# Patient Record
Sex: FEMALE | Race: WHITE | URBAN - NONMETROPOLITAN AREA
[De-identification: names, ages, dates, MRNs, and addresses within clinical notes are randomized per-mention and may not be internally consistent; named-entity substitution may affect disease eponyms.]

---

## 2020-12-28 ENCOUNTER — TELEPHONE (OUTPATIENT)
Dept: FAMILY MEDICINE CLINIC | Age: 35
End: 2020-12-28

## 2021-01-07 ENCOUNTER — VIRTUAL VISIT (OUTPATIENT)
Dept: FAMILY MEDICINE CLINIC | Age: 36
End: 2021-01-07
Payer: COMMERCIAL

## 2021-01-07 DIAGNOSIS — M25.50 ARTHRALGIA, UNSPECIFIED JOINT: ICD-10-CM

## 2021-01-07 DIAGNOSIS — R63.5 WEIGHT GAIN: ICD-10-CM

## 2021-01-07 DIAGNOSIS — M79.671 CHRONIC FOOT PAIN, RIGHT: ICD-10-CM

## 2021-01-07 DIAGNOSIS — M79.672 PAIN OF BOTH HEELS: ICD-10-CM

## 2021-01-07 DIAGNOSIS — G89.29 CHRONIC FOOT PAIN, RIGHT: ICD-10-CM

## 2021-01-07 DIAGNOSIS — Z00.00 ENCOUNTER FOR GENERAL HEALTH EXAMINATION: Primary | ICD-10-CM

## 2021-01-07 DIAGNOSIS — E78.5 ELEVATED LIPIDS: ICD-10-CM

## 2021-01-07 DIAGNOSIS — M79.671 PAIN OF BOTH HEELS: ICD-10-CM

## 2021-01-07 DIAGNOSIS — K59.01 SLOW TRANSIT CONSTIPATION: ICD-10-CM

## 2021-01-07 DIAGNOSIS — K90.89 OTHER INTESTINAL MALABSORPTION: ICD-10-CM

## 2021-01-07 PROCEDURE — 99385 PREV VISIT NEW AGE 18-39: CPT | Performed by: FAMILY MEDICINE

## 2021-01-07 NOTE — LETTER
[address] [place] [address],[address] [facility] [address]  Phone: [phone]  Fax: [phone]    Dakota Martin MD        January 7, 2021    [address] [place] [address]      Dear Eric Batista:    Here are the lab orders and video notes we discussed    If you have any questions or concerns, please don't hesitate to call.     Sincerely,        Dakota Martin MD

## 2021-01-07 NOTE — PROGRESS NOTES
[address] [facility] [address] [address]  Dept: [phone]  Dept Fax: [phone]  Loc: Mayo Sood is a 30 y.o. White female. Stephanie Mccormack  presents to the [facility] Medicine-Residency clinic today by doxy,me video visit which was performed due to the Covid-19 pandemic via a 'synchronous telecommunication system and the Location of the Patient was in their Home, while the Location of the provider was in the provider's home for   Chief Complaint   Patient presents with   [address] Weight Loss     down to 142 when had son, worked with a nutritionist, gained 30#    Constipation    Back Pain     rad tech    Joint Pain     right knee from squats   ,  and;   1. Encounter for general health examination    2. Elevated lipids    3. Other intestinal malabsorption    4. Slow transit constipation    5. Chronic foot pain, right    6. Arthralgia, unspecified joint    7. Pain of both heels    8. Weight gain      I have reviewed [place] medical, surgical and other pertinent history in detail, and have updated medication and allergy information in the computerized patientrecord. Clinical Care Team:     -Referring Provider for today's consult: Self Referred  -Primary Care Provider: Rodney Gibbs    Medical/Surgical History:   She  has no past medical history on file. Her  has no past surgical history on file. Family/Social History:     Her family history is not on file. She      Medications/Allergies/Immunizations:     Her current medication(s) include No current outpatient medications on file. Allergies: Patient has no known allergies. ,  Immunizations: There is no immunization history on file for this patient.      History of PresentIllness: Trini's had concerns including Establish Care, Weight Loss (down to 142 when had son, worked with a nutritionist, gained 30#), Constipation, Back Pain (rad tech), and Joint Pain (right knee from squats). Nabila Dominguez  presents to the [address] [place] [address] today for;   Chief Complaint   Patient presents with   [address] Weight Loss     down to 142 when had son, worked with a nutritionist, gained 30#    Constipation    Back Pain     rad tech    Joint Pain     right knee from squats   , ,  abnormal labs follow up and these conditions as she  Is looking today for:     1. Encounter for general health examination    2. Elevated lipids    3. Other intestinal malabsorption    4. Slow transit constipation    5. Chronic foot pain, right    6. Arthralgia, unspecified joint    7. Pain of both heels    8. Weight gain      HPI    Subjective:     Review of Systems   All other systems reviewed and are negative. Objective: There were no vitals taken for this visit. Physical Exam  Constitutional:       Appearance: Normal appearance. HENT:      Head: Normocephalic. Pulmonary:      Effort: Pulmonary effort is normal.   Neurological:      Mental Status: She is alert. Psychiatric:         Mood and Affect: Mood normal.         Thought Content: Thought content normal.            Laboratory Data:   Lab results were searched in Care Everywhere and/or those brought by the pateint were reviewed today with Ruchi Tierney and she has a copy of their most recent labs to take home with them as notedbelow;       Imaging Data:   Imaging Data:       Assessment & Plan:       Impression:  1. Encounter for general health examination    2. Elevated lipids    3. Other intestinal malabsorption    4. Slow transit constipation    5. Chronic foot pain, right    6. Arthralgia, unspecified joint    7. Pain of both heels    8.  Weight gain      Assessment and Plan: Tesha Goodpasture will bring food+drink symptom log to next visit for inclusion in their record      - 75 better food list reviewed & given topatient with the omega 6 food list to avoid         - Gluten in corn and oats abstracts sheet reviewed and given to the patient today   3. Greater than 45 GT minutes were spent face to face on this visit of which >50% was for counseling and coordination of care; by doxy,me video visit which was performed due to the Covid-19 pandemic via a 'synchronous telecommunication system and the Location of the Patient was in their Home, while the Location of the provider was in the provider's home. Patients food and drinks were reviewed with thepatient,   - they will bring a food drink symptom log to future visits for inclusion in their record    - 75 better food list reviewed & given to patient along with the omega 6 food list to avoid      - Glutenin corn and oats abstracts sheet reviewed and given to the patient today    - 23 Foods containing Latex-like proteins was reviewed and copy to be taken if desired     - Nutrient Supplements list provided and copyto be taken if desired    - [employer]. com web site offered to patient to review at their convenience by staff with login information    Note:  I have discussed with the patient that with all nutraceuticals, there is often mixed data and emerging research which needs to be monitored; as well as an array of NIHfact sheets on nutrients and supplements. If I have recommended cinnamon at the request of this patient to assist them in control of their blood sugar, triglyceride and or weight issues. I discussed that thepatient's clinical use of cinnamon bark, calcium, magnesium, Vitamin D and pharmaceutical grade CVS #117663 fish oil or triple-strength fish oil, and B-75 two phase time-released B complex by Verda Brunner will be for atime-limited trial to determine their individual effectiveness and safety in this patient. I also referred the patient to the NMCD: Nutrition, Metabolism, and Cardiovascular Diseases (journal) and concerns about long-termuse and hepatotoxicity of cinnamon and other nutrients and suggest they frequently search nih.gov for the latest non-proprietary information on nutriceuticals as well as consider a subscription to [employer] fordetails on reviewed supplements, or at the least review the nutrient files at [address] at [facility], [employer], an insulin mimetic, reduces some High Carbohydrate Dietary Impacts. Methylhydroxychalcone polymers insulin-enhancing properties in fat cells are responsible for enhanced glucose uptake, inhibiting hepatic HMG-CoA reductase and lowers lipids. www.jacn. org/content/20/4/327.full     But cinnamon with additivessuch as Cinnamon Extract are not effective as insulin mimetics.  :eStoreDirectory.at     Nutrients for Start up from [employer] or [employer] for ease to get started now ;  Rolf Stewart has some useable products;  - Triple Strength Fish Oil, enteric coated  - Vit D 3 5000 IU gel caps  - Iron ferrous sulfat 325 mg tabs  - Centrum Silver look-a-like for most patients, or  - Centrum plain look-a-like if need iron    Localpharmacies or chains such as [employer], [employer], Wal-mart, have;  - Triple Strength Fish Oil (enteric coated ifavailable) or    If not enteric coated, can take from freezer for less burps - B-50 or B-100 released balanced B complex tabs  - Cinnamon bark 500 mg (without Chromium or extracts)   some brands list 1000 mg / serving of 2 capsules,    some brands have 1000 mg caps with the undesireable chromium / extract  - Calcium carbonate/citrate, magnesium oxide/citrate, Vit D 3  as 3-4 tabs/caps/serving     Some Local Brands may contain Zincwhich is acceptable for the first bottle or two  - Magnesium oxide 250 mg tabs for those having < 2 bowel movements daily  - Magnesium citrate 200 mg if having > 2bowel movement/day  - Centrum Silver or look-a-like for most patients, Centrum plain or look-a-like with iron  - Vitamin D-3 comes as 1,000 IU or 2,000 IU or 5,000 IU gel caps or Liquid drops      Some brands containing or derived from soy oil or corn oil are OK if not allergic to soy  - Elemental Iron 65 mg tabsat bedtime is available over the counter if need more iron     Usually turns bowel movements grey, green or black but not a concern  - Apricot Kernel Oil (by Now) for dry skin sensitive perineal or perianal area skin    Nutrients for ongoing use by Mail order for less expense from [employer] ;  - Strength Fish Oil , 240 Softgels Item #168734  -B-100 time released balanced B complex Item #682517  - Cinnamon bark 500 mg without Chromium or extract Item #911860  - Calcium carbonate 1000 mg, Magnesium oxide 500 mg, Vit D 3  400 IU Item #018368  - Magnesium oxide 500 mg tabs Item #137710 if less than 2 bowel movements daily  - [employer] Item #091990 for mostpatients, One Daily Item #849226 with iron  - Vit D 3  1,000 Item #247154      2,000 IU Item #826224  ,000 IU Item #495685     Some brands containing orderived from soy oil or corn oil are OK if not allergic to soy    Nutrients for Special Needs by Joe Mckenzie for less expense from www. puritan.com ;  -Elemental Iron 65 mg tabs Item #647656 if need more iron for low iron on labs Usually turns bowel movements grey, green or black but not a concern  - Time released Niacin 250 mg Item #266445 for cold intolerance, low libido or impotence  - DHEA 50 mg Item #200307 for improving DHEA levels on labs if having Fatigue    If stools too loose substitute for your Magnesium oxide using;   Magnesium citrate 200 mg tabs(NOT liquid) at [employer]   Magnesium gluconate 550 mgby Varun at [employer] com or amazon. com  Magnesium chloride foot soaks or body sprays  www.[employer]   Magnesium chloride flakes 14.99 Item #: [id number] if Backordered get spray    Food Drink Symptom Log;  I asked this patient to track these items and any other symptoms on their list on a weekly basis to documenttheir progress or lack of same. This can be done on the symptom tracking sheet I gave them at today's visit but looks like this:                                                      Rate on scale of 0-10 with zero = notnoticeable  Symptom:                            Week 1               2                 3                 4               Etc            Hair loss    Foot cramps    Paresthesia    Aches    IBS (irritable bowel)    Constipation    Diarrhea  Nocturia    (up to bathroom at night)    Fatigue/Energy level  Stress      On the other side of the sheet they can track their food, drink, environment, activity, symptoms etc      Avoiding Latex-like proteins inmy foods; Avocados, Bananas, Celery, Figs & Kiwi proteins have latex-like proteins to inflame our immunesystems  How Can I Have A Latex Allergy? Eating foods with latex-like protein exposes us to latex allergies. August; apples, beans, beets, blueberries, cabbage, cantaloupe, carrots,cauliflower, celery, cucumbers, eggplant, grapes, green onions, greens, lettuce, onions, parsley, peas, peaches, pears, bell peppers, potatoes, radishes, squash, sweet corn, tomatoes, turnips, watermelons  September; apples, beans, beets, blueberries, cabbage, cantaloupe, carrots, cauliflower, celery, cucumbers, eggplant, grapes,green onions, greens, lettuce, onions, parsley, peas, peaches, pears, bell peppers, plums, potatoes, pumpkins, radishes, fall red raspberries, squash, sweet corn, tomatoes, turnips, watermelons  October; apples, beets, broccoli, cabbage, carrots, cauliflower, celery, green onions, greens, lettuce, parsley, peas, pears, potatoes,pumpkins, radishes, fall red raspberries, squash, turnips  November; broccoli, cabbage, carrots, parsley,pears, peas  December: use canned, frozen ordried fruits since lower in latex    Upto half of latex-sensitive patients show allergic reactions to fruits (avocados, bananas, kiwifruits, papayas, peaches),   Annals of Allergy, 1994. These plants contain the same proteins that are allergens in latex. People with fruit allergies should warn physicians beforeundergoing procedures which may cause anaphylactic reaction if in contact with latex gloves. Some of the common foods with defined cross-reactivity to latexare avocado, banana, kiwi, chestnut, raw potato, tomato,stone fruits (e.g., peach, cherry), hazelnut, melons, celery, carrot, apple, pear, papaya, and almond.   Foods with less well-defined cross-reactivity to latex are peanuts, peppers, citrus fruits, coconut, pineapple, pete,fig, passion fruit, Ugli fruit, and grape This fruit/latex cross-reactivity is worsened by ethylene, a gas used to hasten commercial ripening. In nature, plants produce low levels of the hormone ethylene, which regulates germination, flowering, and ripening. Forced ripening by high ethyleneconcentrations, plants produce allergenic wound-repair proteins, which are similar to wound-repair proteins made during the tapping of rubber trees. Sensitive individualswho ingest the fruit get a higher dose and worse reaction. Some people may even first become sensitized to latex through fruit. Can food processing increase theconcentrations of allergenic proteins? Latex-sensitized children (and adults) in [place] often experience allergic reactions after eating bananas ripenedartificially with ethylene. In the [facility], food distribution centers treat unripe bananas and other produce with ethylene to ripen; not commonly done in [facility] since fruit is tree-ripened there. Does treatmentof food with ethylene induce banana proteins that cross-react with latex? (Evelia et al.    References:   Latex in Foods Allergy, http://ehp.niehs.nih.gov/members/2003/5811/5811.html    Search web for \" Whats in Season \" for whereyou live or are at the time you food shop  www.nutritioncouncil.org/pdf/healthy/SeasonalProduce. pdf ,   Management of Latex, ://medicalcenter. o[facility].edu/  search for latex

## 2021-01-12 ENCOUNTER — PATIENT MESSAGE (OUTPATIENT)
Dept: FAMILY MEDICINE CLINIC | Age: 36
End: 2021-01-12

## 2021-01-25 ENCOUNTER — PATIENT MESSAGE (OUTPATIENT)
Dept: FAMILY MEDICINE CLINIC | Age: 36
End: 2021-01-25

## 2021-01-25 NOTE — TELEPHONE ENCOUNTER
From: Cirilo Harvey  To: Kalin Jade MD  Sent: 1/12/2021 12:16 PM EST  Subject: Test Results Question    Hello,   Do I need to be fasting for my bloodwork?   Thank you,    Claudeen Igo  (I have several other questions, that I'll send separately.)

## 2021-01-27 ENCOUNTER — PATIENT MESSAGE (OUTPATIENT)
Dept: FAMILY MEDICINE CLINIC | Age: 36
End: 2021-01-27

## 2021-01-27 NOTE — TELEPHONE ENCOUNTER
From: Isabel Barrera  To: An Fletcher MD  Sent: 1/27/2021 3:10 PM EST  Subject: Test Results Question    Okay, thank you! What is the website? [facility]       ----- Message -----   From:MA Juan Jose Art   Sent:1/27/2021 3:04 PM EST   To:Trini Vizcaino   Subject:RE: Test Results Question    [email or web address]    Log in: [facility] word: [id number] I warn you that the recipes section doesn't contain anything. You are now a member of the web site. Hope you find the information informative! Rustam Gibson      ----- Message -----   From:Trini Cross   Sent:1/27/2021 12:58 PM EST   To:Elan Madrid MD   Subject:Test Results Question    OK that would be great! Yes my email is [email or web address] you can use [email or web address] as my password. Thank you,  [facility]      ----- Message -----   From:JERRY Blakeor Art   Sent:1/27/2021 12:47 PM EST   To:Trini Vizcaino   Subject:RE: Test Results Question    I can make you a member of his classes on a web site. I need a user name, and a password that is 8 characters. To confirm I have your email as [email or web address]    Is this correct? The web site doesn't contain any patient information, so just type it the same way you would like it entered into our system. Rustam Gibson      ----- Message -----   From:Trini Cross   Sent:1/27/2021 10:33 AM EST   To:Elan Madrid MD   Subject:Test Results Question    Antonetta Collet, that makes sense. Is licensure in [place] something he'd consider at some point? Not sure how complex that process is. We're just getting started with him and are very happy with him! He's very thorough, knowledgeable and patient. Also, do you have any podcasts that cover his wee protocol more in depth? I've just read his book, which was helpful; but I feel like I've just scratched the surface.      Thank you,  Mel Litten      ----- Message -----   From:JERRY Cordova   Sent:1/26/2021 9:23 AM EST   To:Trini Vizcaino   Subject:RE: Test Results Question    I am sorry, but a telephone visit will not work either. The issue is that he is not licensed in your state. He needs to have a license in the state to practice. This is a state and federal ruling, not ours. ----- Message -----   From:Trini Aguilera   Sent:1/25/2021 5:03 PM EST   To:Elan Rogers MD   Subject:RE: Test Results Question    P.S. This is one of the gf bread options I was looking at, but wasn't sure if sourghum flour and agave syrup were ok? Thoughts? Thanks,  Sarah Agee       ----- Message -----   From:Elan Rogers MD   Sent:1/20/2021 7:16 AM EST   To:Trini Aguilera   Subject:RE: Test Results Question    Thanks for the update,   I reviewed those done and sent you a Qraniot message with ? Where not done  Ask the lab to fax the final labs to [phone]      ----- Message -----   From:Trini Vizcaino   Sent:1/19/2021 10:22 PM EST   To:Elan Rogers MD   Subject:RE: Test Results Question    Thank you for answering my questions. I wanted to let you know that I went to have my bloodwork done last week & most of my results are back. Still looks like there's a few more to come back. I wanted to make sure you were aware and were able to access them, as the hospital I went to had to put my PCP as the ordering physician [Dr. Ankur Harrison.   Thanks,  Sarah Agee      ----- Message -----   From:Elan Rogers MD   Sent:1/14/2021 10:00 AM EST   To:Trini Vizcaino   Subject:RE: Test Results Question    I also wanted to see what you're thoughts on dairy are?   = good if not allergic    And thoughts are pasture raised/free range chicken?   = good but still can not process the corn its eaten so stores that since being hatched which is still toxic to you    By eating significantly lower calories, do you think that could cause my hormones to become unbalanced?   = should make the hormones go further so hormones should be better balanced    Obviously there isn't a numerical answer for this without bloodwork before and after for comparison, but in your experience what are your thoughts on this?   =that is my observations  Goran Welch      ----- Message -----   From:Trini Vizcaino   Sent:1/14/2021 9:07 AM EST   To:Elan Turcios MD   Subject:RE: Test Results Question    Okay, thank you for answering all  Of my questions. I just had my bloodwork done this morning! I also wanted to see what you're thoughts on dairy are? And thoughts are pasture raised/free range chicken? By eating significantly lower calories, do you think that could cause my hormones to become unbalanced? Obviously there isn't a numerical answer for this without bloodwork before and after for comparison, but in your experience what are your thoughts on this? Thank you,  Nancy Kuhn      ----- Message -----   Velma Grady MD   Sent:1/13/2021 11:17 AM EST   To:Trini Vizcaino   Subject:RE: Test Results Question    1,000 calories/day?   = if you want to lose weight it takes 4000 calories lost or burnt to lose 1# of weigh    Do you think that the number of calories you eat effects your metabolism?  = number of calories require higher metabolism not vice versa     And hormones?   = DHEA level, testosterone level and T3, T4 levels do     Specifically since I'm very active with my job   = activity counts if continuous movement for 30 min at a time but stops when you body stops moving    and with my workouts?   = metabolic increase acts for 48 hours    The nutrition  I was working with had my increase from 7921-8879; but too quickly. ..,hence my 25lb weight gain.    = 25,4000 Feliz =100,000 calories to burn or store    How many grams of protein/fat?   = depends on weight and body type      Are steel cut oats safe?   = all oats on the planet = 19 ppm gluten, including those labeled gluten free since they are >5 ppm          ----- Message -----   From:Trini Vizcaino   Sent:1/13/2021 10:51 AM EST   To:Elan Turcios MD Subject:RE: Test Results Question    1,000 calories/day? Do you think that the number of calories you eat effects your metabolism? And hormones? Specifically since I'm very active with my job and with my workouts? The nutrition  I was working with had my increase from 6053-3885; but too quickly. ..,hence my 25lb weight gain. How many grams of protein/fat? Are steel cut oats safe? Thank you,  Royal Espinal       ----- Message -----   Jesu Riggs MD   Sent:1/13/2021 6:51 AM EST   To:Trini Vizcaino   Subject:RE: Test Results Question    -Do you have a recommendation for how many calories, grams of fat/protein I should be eating daily for weight loss? = 1000 per day if want to lose weight plus work up to 30 min of safe exercise every other day    -How many milligrams of magnesium do you recommend daily? (Do you recommend a certain brand-free of additives?)   =see your lab letter which addresses both      -What are your thoughts on raw honey, pure maple syrup, cane sugar, coconut sugar, raw sugar?   = all good except Stevia, monk fruit,     -Are there any nuts that you feel are safe?   = macadamia    -How about nut butters?   = none    -are beans, besides chickpeas OK?   = yes see your handout    -Avocado oil?   = no  If not, which oils are healthy and safe for cooking with?   = butter, coconut oil, olive oil, lard are the only safe ones    Thoughts on gluten-free oats? Or do they still contain the gluten like component? steel cut oats? =not gluten free since have 19 ppm of gluten, to be gluten free medically need to be < 5 ppm    -aerobic exercise for < 30min every other day? And is strength training ok in between those days?   = yes if reasonable    -organic chicken?  Or is that still corn fed?   = yes still not good since stored all the corn oil since hatched    Do you have any podcasts/ additional nutrition information available in addition to your book?   = yes Leonid Gallardo can give you a password for the 6 free online lectures mentioned in your lab letter      ----- Message -----   From:Tirni Vizcaino   Sent:1/12/2021 4:45 PM EST   To:Elan Briseno MD   Subject:RE: Test Results Question    OK, thank you! I have a few questions for you after our last conversation and as Im reading your book:    -Do you have a recommendation for how many calories, grams of fat/protein I should be eating daily for weight loss?    -How many milligrams of magnesium do you recommend daily? (Do you recommend a certain brand-free of additives?)      -What are your thoughts on Stevia, monk fruit, raw honey, pure maple syrup, cane sugar, coconut sugar, raw sugar? -Are there any nuts that you feel are safe? -How about nut butters?    -are beans, besides chickpeas OK?    -Avocado oil? If not, which oils are healthy and safe for cooking with? Thoughts on gluten-free oats? Or do they still contain the gluten like component? steel cut oats?    -aerobic exercise for < 30min every other day? And is strength training ok in between those days?    -organic chicken? Or is that still corn fed? Do you have any podcasts/ additional nutrition   information available in addition to your book? Thank you,  Varun Ho       ----- Message -----   From:Elan Briseno MD   Sent:1/12/2021 2:32 PM EST   To:Trini Vizcaino   Subject:RE: Test Results Question    Can be but do not need to be for us      ----- Message -----   From:Trini Vizcaino   Sent:1/12/2021 12:16 PM EST   To:Elan Briseno MD   Subject:Test Results Question    Hello,   Do I need to be fasting for my bloodwork?   Thank you,    Varun Ho  (I have several other questions, that I'll send separately.)

## 2021-01-27 NOTE — TELEPHONE ENCOUNTER
From: Jonatan Escudero  To: Mally Martinez MD  Sent: 1/27/2021 12:58 PM EST  Subject: Test Results Question    OK that would be great! Yes my email is [email or web address] you can use [email or web address] as my password. Thank you,  Gergennita Leslie      ----- Message -----   From:JERRY Laws   Sent:1/27/2021 12:47 PM EST   To:Trinijuanjose Vizcaino   Subject:RE: Test Results Question    I can make you a member of his classes on a web site. I need a user name, and a password that is 8 characters. To confirm I have your email as [email or web address]    Is this correct? The web site doesn't contain any patient information, so just type it the same way you would like it entered into our system. Alison Demarcus      ----- Message -----   From:Trini Cotto   Sent:1/27/2021 10:33 AM EST   To:Elan Sheldon MD   Subject:Test Results Question    Avelina Lorenz, that makes sense. Is licensure in [place] something he'd consider at some point? Not sure how complex that process is. We're just getting started with him and are very happy with him! He's very thorough, knowledgeable and patient. Also, do you have any podcasts that cover his wee protocol more in depth? I've just read his book, which was helpful; but I feel like I've just scratched the surface. Thank you,  Ricky Banks      ----- Message -----   From:JERRY Laws   Sent:1/26/2021 9:23 AM EST   To:Trini Vizcaino   Subject:RE: Test Results Question    I am sorry, but a telephone visit will not work either. The issue is that he is not licensed in your state. He needs to have a license in the state to practice. This is a state and federal ruling, not ours. ----- Message -----   From:Trini Cotto   Sent:1/25/2021 5:03 PM EST   To:Elan Sheldon MD   Subject:RE: Test Results Question    P.S. This is one of the gf bread options I was looking at, but wasn't sure if sourghum flour and agave syrup were ok? Thoughts?   Thanks,  Greg Leslie       ----- Message -----   From:Elan BARNES Roxane Phillips MD   Sent:1/20/2021 7:16 AM EST   To:Trini Vizcaino   Subject:RE: Test Results Question    Thanks for the update,   I reviewed those done and sent you a [employer] message with ? Where not done  Ask the lab to fax the final labs to [phone]      ----- Message -----   From:Trini Vizcaino   Sent:1/19/2021 10:22 PM EST   To:Elan Saenz MD   Subject:RE: Test Results Question    Thank you for answering my questions. I wanted to let you know that I went to have my bloodwork done last week & most of my results are back. Still looks like there's a few more to come back. I wanted to make sure you were aware and were able to access them, as the hospital I went to had to put my PCP as the ordering physician [Dr. Tim Dorsey. Thanks,  Katelyn Rodriguez      ----- Message -----   From:Elan Saenz MD   Sent:1/14/2021 10:00 AM EST   To:Trini Vizcaino   Subject:RE: Test Results Question    I also wanted to see what you're thoughts on dairy are?   = good if not allergic    And thoughts are pasture raised/free range chicken?   = good but still can not process the corn its eaten so stores that since being hatched which is still toxic to you    By eating significantly lower calories, do you think that could cause my hormones to become unbalanced?   = should make the hormones go further so hormones should be better balanced    Obviously there isn't a numerical answer for this without bloodwork before and after for comparison, but in your experience what are your thoughts on this?   =that is my observations  [facility]      ----- Message -----   From:Trini Vizcaino   Sent:1/14/2021 9:07 AM EST   To:Elan Saenz MD   Subject:RE: Test Results Question    Okay, thank you for answering all  Of my questions. I just had my bloodwork done this morning! I also wanted to see what you're thoughts on dairy are? And thoughts are pasture raised/free range chicken?    By eating significantly lower calories, do you think that could cause my hormones to become unbalanced? Obviously there isn't a numerical answer for this without bloodwork before and after for comparison, but in your experience what are your thoughts on this? Thank you,  Nessa Cabezas      ----- Message -----   Zabrina Paiz MD   Sent:1/13/2021 11:17 AM EST   To:Trini Vizcaino   Subject:RE: Test Results Question    1,000 calories/day?   = if you want to lose weight it takes 4000 calories lost or burnt to lose 1# of weigh    Do you think that the number of calories you eat effects your metabolism?  = number of calories require higher metabolism not vice versa     And hormones?   = DHEA level, testosterone level and T3, T4 levels do     Specifically since I'm very active with my job   = activity counts if continuous movement for 30 min at a time but stops when you body stops moving    and with my workouts?   = metabolic increase acts for 48 hours    The nutrition  I was working with had my increase from 6351-7134; but too quickly. ..,hence my 25lb weight gain. = 25,4000 Feliz =100,000 calories to burn or store    How many grams of protein/fat?   = depends on weight and body type      Are steel cut oats safe?   = all oats on the planet = 19 ppm gluten, including those labeled gluten free since they are >5 ppm          ----- Message -----   From:Trini ZAYAS Carrillo   Sent:1/13/2021 10:51 AM EST   To:Elan Puente MD   Subject:RE: Test Results Question    1,000 calories/day? Do you think that the number of calories you eat effects your metabolism? And hormones? Specifically since I'm very active with my job and with my workouts? The nutrition  I was working with had my increase from 7958-2953; but too quickly. ..,hence my 25lb weight gain. How many grams of protein/fat? Are steel cut oats safe?     Thank you,  Nessa Cabezas       ----- Message -----   Zabrina Paiz MD   Sent:1/13/2021 6:51 AM EST   To:Trini Sousa   Subject:RE: Test Results are your thoughts on Stevia, monk fruit, raw honey, pure maple syrup, cane sugar, coconut sugar, raw sugar? -Are there any nuts that you feel are safe? -How about nut butters?    -are beans, besides chickpeas OK?    -Avocado oil? If not, which oils are healthy and safe for cooking with? Thoughts on gluten-free oats? Or do they still contain the gluten like component? steel cut oats?    -aerobic exercise for < 30min every other day? And is strength training ok in between those days?    -organic chicken? Or is that still corn fed? Do you have any podcasts/ additional nutrition   information available in addition to your book? Thank you,  Daija De Santiago       ----- Message -----   From:Elan Zuniga MD   Sent:1/12/2021 2:32 PM EST   To:Trini Vizcaino   Subject:RE: Test Results Question    Can be but do not need to be for us      ----- Message -----   From:Trini Vizcaino   Sent:1/12/2021 12:16 PM EST   To:Elan Zuniga MD   Subject:Test Results Question    Hello,   Do I need to be fasting for my bloodwork?   Thank you,    Daija De Santiago  (I have several other questions, that I'll send separately.)

## 2021-01-27 NOTE — TELEPHONE ENCOUNTER
From: Charlotte Ga  To: Juany Griffin MD  Sent: 1/27/2021 10:33 AM EST  Subject: Test Results Question    Okay, that makes sense. Is [place] in [place] something he'd consider at some point? Not sure how complex that process is. We're just getting started with him and are very happy with him! He's very thorough, knowledgeable and patient. Also, do you have any podcasts that cover his wee protocol more in depth? I've just read his book, which was helpful; but I feel like I've just scratched the surface. Thank you,  Eloisa Tobias      ----- Message -----   From:JERRY Holliday   Sent:1/26/2021 9:23 AM EST   To:Trini Vizcaino   Subject:RE: Test Results Question    I am sorry, but a telephone visit will not work either. The issue is that he is not licensed in your state. He needs to have a license in the state to practice. This is a state and federal ruling, not ours. ----- Message -----   From:Trini Mejia   Sent:1/25/2021 5:03 PM EST   To:Elan Chan MD   Subject:RE: Test Results Question    P.S. This is one of the gf bread options I was looking at, but wasn't sure if sourghum flour and agave syrup were ok? Thoughts? Thanks,  Olivia Morris       ----- Message -----   From:Elan Chan MD   Sent:1/20/2021 7:16 AM EST   To:Trini Mejia   Subject:RE: Test Results Question    Thanks for the update,   I reviewed those done and sent you a [employer] message with ? Where not done  Ask the lab to fax the final labs to [phone]      ----- Message -----   From:Trini Vizcaino   Sent:1/19/2021 10:22 PM EST   To:Elan Chan MD   Subject:RE: Test Results Question    Thank you for answering my questions. I wanted to let you know that I went to have my bloodwork done last week & most of my results are back. Still looks like there's a few more to come back.    I wanted to make sure you were aware and were able to access them, as the hospital I went to had to put my PCP as the ordering physician [Dr. Ramirez Rocha Nayanaluis miguel Sun      ----- Message -----   From:Elan Leslie MD   Sent:1/14/2021 10:00 AM EST   To:Trini Villar   Subject:RE: Test Results Question    I also wanted to see what you're thoughts on dairy are?   = good if not allergic    And thoughts are pasture raised/free range chicken?   = good but still can not process the corn its eaten so stores that since being hatched which is still toxic to you    By eating significantly lower calories, do you think that could cause my hormones to become unbalanced?   = should make the hormones go further so hormones should be better balanced    Obviously there isn't a numerical answer for this without bloodwork before and after for comparison, but in your experience what are your thoughts on this?   =that is my observations  Wynell Night      ----- Message -----   From:Trini Vizcaino   Sent:1/14/2021 9:07 AM EST   To:Elan Leslie MD   Subject:RE: Test Results Question    Okay, thank you for answering all  Of my questions. I just had my bloodwork done this morning! I also wanted to see what you're thoughts on dairy are? And thoughts are pasture raised/free range chicken? By eating significantly lower calories, do you think that could cause my hormones to become unbalanced? Obviously there isn't a numerical answer for this without bloodwork before and after for comparison, but in your experience what are your thoughts on this?     Thank you,  Gissel Claros      ----- Message -----   Rukhsana Nagel MD   Sent:1/13/2021 11:17 AM EST   To:Trini Vizcaino   Subject:RE: Test Results Question    1,000 calories/day?   = if you want to lose weight it takes 4000 calories lost or burnt to lose 1# of weigh    Do you think that the number of calories you eat effects your metabolism?  = number of calories require higher metabolism not vice versa     And hormones?   = DHEA level, testosterone level and T3, T4 levels do     Specifically since I'm very active with my job = activity counts if continuous movement for 30 min at a time but stops when you body stops moving    and with my workouts?   = metabolic increase acts for 48 hours    The nutrition  I was working with had my increase from 8470-4200; but too quickly. ..,hence my 25lb weight gain. = 25,4000 Feliz =100,000 calories to burn or store    How many grams of protein/fat?   = depends on weight and body type      Are steel cut oats safe?   = all oats on the planet = 19 ppm gluten, including those labeled gluten free since they are >5 ppm          ----- Message -----   From:Trini Vizcaino   Sent:1/13/2021 10:51 AM EST   To:Elan Aquino MD   Subject:RE: Test Results Question    1,000 calories/day? Do you think that the number of calories you eat effects your metabolism? And hormones? Specifically since I'm very active with my job and with my workouts? The nutrition  I was working with had my increase from 7928-6819; but too quickly. ..,hence my 25lb weight gain. How many grams of protein/fat? Are steel cut oats safe? Thank you,  Hannah Essex       ----- Message -----   Angel Castelan MD   Sent:1/13/2021 6:51 AM EST   To:Trini Vizcaino   Subject:RE: Test Results Question    -Do you have a recommendation for how many calories, grams of fat/protein I should be eating daily for weight loss? = 1000 per day if want to lose weight plus work up to 30 min of safe exercise every other day    -How many milligrams of magnesium do you recommend daily?  (Do you recommend a certain brand-free of additives?)   =see your lab letter which addresses both      -What are your thoughts on raw honey, pure maple syrup, cane sugar, coconut sugar, raw sugar?   = all good except Stevia, monk fruit,     -Are there any nuts that you feel are safe?   = macadamia    -How about nut butters?   = none    -are beans, besides chickpeas OK?   = yes see your handout    -Avocado oil?   = no  If not, which oils are healthy and safe for cooking with?   = butter, coconut oil, olive oil, lard are the only safe ones    Thoughts on gluten-free oats? Or do they still contain the gluten like component? steel cut oats? =not gluten free since have 19 ppm of gluten, to be gluten free medically need to be < 5 ppm    -aerobic exercise for < 30min every other day? And is strength training ok in between those days?   = yes if reasonable    -organic chicken? Or is that still corn fed?   = yes still not good since stored all the corn oil since hatched    Do you have any podcasts/ additional nutrition information available in addition to your book?   = yes Rajni Finn can give you a password for the 6 free online lectures mentioned in your lab letter      ----- Message -----   From:Trini Vizcaino   Sent:1/12/2021 4:45 PM EST   To:Elan Sheldon MD   Subject:RE: Test Results Question    OK, thank you! I have a few questions for you after our last conversation and as Im reading your book:    -Do you have a recommendation for how many calories, grams of fat/protein I should be eating daily for weight loss?    -How many milligrams of magnesium do you recommend daily? (Do you recommend a certain brand-free of additives?)      -What are your thoughts on Stevia, monk fruit, raw honey, pure maple syrup, cane sugar, coconut sugar, raw sugar? -Are there any nuts that you feel are safe? -How about nut butters?    -are beans, besides chickpeas OK?    -Avocado oil? If not, which oils are healthy and safe for cooking with? Thoughts on gluten-free oats? Or do they still contain the gluten like component? steel cut oats?    -aerobic exercise for < 30min every other day? And is strength training ok in between those days?    -organic chicken? Or is that still corn fed? Do you have any podcasts/ additional nutrition   information available in addition to your book?     Thank you,  Ricky Banks       ----- Message -----   Chema Bonner MD   Sent:1/12/2021 2:32 PM EST   To:Trini Vizcaino   Subject:RE: Test Results Question    Can be but do not need to be for us      ----- Message -----   From:Trini Vizcaino   Sent:1/12/2021 12:16 PM EST   To:Elan Kumar MD   Subject:Test Results Question    Hello,   Do I need to be fasting for my bloodwork?   Thank you,    Franco Henderson  (I have several other questions, that I'll send separately.)

## 2021-02-25 NOTE — TELEPHONE ENCOUNTER
From: Michael Garcia  To: Natalie Aguilera MD  Sent: 1/25/2021 8:41 PM EST  Subject: Test Results Question    Okay, thank you. How many mg should I be taking daily of calcium, vitamin d, & magnesium? I just purchased calcium 600mg tabs, vitamin d-3 &k-2 1000IU/45mcg. Thank you,  Chad Villegas      ----- Message -----   From:Elan Rader MD   Sent:1/25/2021 5:32 PM EST   To:Trini Calloway   Subject:RE: Test Results Question    It will be OK for most gluten sensitive people      -Is the red rice yeast supplement something to try if the other supplements and dietary changes don't help with my cholesterol?   = yes     Or should I do that along with those mentioned changes? =yes       -& do you have any recommendations on gluten free bread? I have found a lot of gluten-free bread options, but not a lot that are free of the plant-based oils as well. Or what about bread mixes or pizza crust mixes with those same criteria? Any recommendations are greatly appreciate it thank you.   = canyon ranch gluten free are good      ----- Message -----   From:Trini Vizcaino   Sent:1/25/2021 5:03 PM EST   To:Elan Rader MD   Subject:RE: Test Results Question    P.S. This is one of the gf bread options I was looking at, but wasn't sure if sourghum flour and agave syrup were ok? Thoughts? Thanks,  Chad Villegas       ----- Message -----   From:Elan Rader MD   Sent:1/20/2021 7:16 AM EST   To:Trini Calloway   Subject:RE: Test Results Question    Thanks for the update,   I reviewed those done and sent you a [employer] message with ? Where not done  Ask the lab to fax the final labs to [phone]      ----- Message -----   From:Trini Vizcaino   Sent:1/19/2021 10:22 PM EST   To:Elan Rader MD   Subject:RE: Test Results Question    Thank you for answering my questions. I wanted to let you know that I went to have my bloodwork done last week & most of my results are back. Still looks like there's a few more to come back.    I about nut butters?   = none    -are beans, besides chickpeas OK?   = yes see your handout    -Avocado oil?   = no  If not, which oils are healthy and safe for cooking with?   = butter, coconut oil, olive oil, lard are the only safe ones    Thoughts on gluten-free oats? Or do they still contain the gluten like component? steel cut oats? =not gluten free since have 19 ppm of gluten, to be gluten free medically need to be < 5 ppm    -aerobic exercise for < 30min every other day? And is strength training ok in between those days?   = yes if reasonable    -organic chicken? Or is that still corn fed?   = yes still not good since stored all the corn oil since hatched    Do you have any podcasts/ additional nutrition information available in addition to your book?   = yes [employer] can give you a password for the 6 free online lectures mentioned in your lab letter      ----- Message -----   From:Trini Vizcaino   Sent:1/12/2021 4:45 PM EST   To:Elan Howard MD   Subject:RE: Test Results Question    OK, thank you! I have a few questions for you after our last conversation and as Im reading your book:    -Do you have a recommendation for how many calories, grams of fat/protein I should be eating daily for weight loss?    -How many milligrams of magnesium do you recommend daily? (Do you recommend a certain brand-free of additives?)      -What are your thoughts on Stevia, monk fruit, raw honey, pure maple syrup, cane sugar, coconut sugar, raw sugar? -Are there any nuts that you feel are safe? -How about nut butters?    -are beans, besides chickpeas OK?    -Avocado oil? If not, which oils are healthy and safe for cooking with? Thoughts on gluten-free oats? Or do they still contain the gluten like component? steel cut oats?    -aerobic exercise for < 30min every other day? And is strength training ok in between those days?    -organic chicken? Or is that still corn fed?     Do you have any podcasts/ additional nutrition information available in addition to your book? Thank you,  Daija De Santiago       ----- Message -----   From:Elan Zuniga MD   Sent:1/12/2021 2:32 PM EST   To:Trini Vizcaino   Subject:RE: Test Results Question    Can be but do not need to be for us      ----- Message -----   From:Trini Vizcaino   Sent:1/12/2021 12:16 PM EST   To:Elan Zuniga MD   Subject:Test Results Question    Hello,   Do I need to be fasting for my bloodwork?   Thank you,    Daija De Santiago  (I have several other questions, that I'll send separately.)

## 2021-08-23 DIAGNOSIS — K90.89 OTHER INTESTINAL MALABSORPTION: ICD-10-CM

## 2021-08-23 DIAGNOSIS — M79.672 PAIN OF BOTH HEELS: ICD-10-CM

## 2021-08-23 DIAGNOSIS — R63.5 WEIGHT GAIN: ICD-10-CM

## 2021-08-23 DIAGNOSIS — M79.671 PAIN OF BOTH HEELS: ICD-10-CM

## 2021-08-23 DIAGNOSIS — K59.01 SLOW TRANSIT CONSTIPATION: ICD-10-CM

## 2021-08-23 DIAGNOSIS — E78.5 ELEVATED LIPIDS: Primary | ICD-10-CM

## 2021-08-23 DIAGNOSIS — M79.671 CHRONIC FOOT PAIN, RIGHT: ICD-10-CM

## 2021-08-23 DIAGNOSIS — G89.29 CHRONIC FOOT PAIN, RIGHT: ICD-10-CM

## 2021-08-23 DIAGNOSIS — Z34.90 PREGNANCY, UNSPECIFIED GESTATIONAL AGE: ICD-10-CM

## 2021-08-23 DIAGNOSIS — M25.50 ARTHRALGIA, UNSPECIFIED JOINT: ICD-10-CM

## 2021-08-23 PROBLEM — L30.1 ECZEMA, DYSHIDROTIC: Status: ACTIVE | Noted: 2018-10-17
